# Patient Record
Sex: MALE | Race: OTHER | Employment: FULL TIME | URBAN - NONMETROPOLITAN AREA
[De-identification: names, ages, dates, MRNs, and addresses within clinical notes are randomized per-mention and may not be internally consistent; named-entity substitution may affect disease eponyms.]

---

## 2024-07-19 ENCOUNTER — APPOINTMENT (EMERGENCY)
Dept: CT IMAGING | Facility: HOSPITAL | Age: 44
End: 2024-07-19
Payer: COMMERCIAL

## 2024-07-19 ENCOUNTER — HOSPITAL ENCOUNTER (EMERGENCY)
Facility: HOSPITAL | Age: 44
Discharge: HOME | End: 2024-07-19
Attending: EMERGENCY MEDICINE
Payer: COMMERCIAL

## 2024-07-19 VITALS
WEIGHT: 260.58 LBS | SYSTOLIC BLOOD PRESSURE: 138 MMHG | OXYGEN SATURATION: 97 % | HEART RATE: 54 BPM | RESPIRATION RATE: 15 BRPM | TEMPERATURE: 98.6 F | DIASTOLIC BLOOD PRESSURE: 78 MMHG

## 2024-07-19 DIAGNOSIS — R51.9 ACUTE NONINTRACTABLE HEADACHE: ICD-10-CM

## 2024-07-19 DIAGNOSIS — I10 ESSENTIAL HYPERTENSION: Primary | ICD-10-CM

## 2024-07-19 LAB
ALBUMIN SERPL BCG-MCNC: 4.3 G/DL (ref 3.5–5)
ALP SERPL-CCNC: 77 U/L (ref 34–104)
ALT SERPL W P-5'-P-CCNC: 26 U/L (ref 7–52)
ANION GAP SERPL CALCULATED.3IONS-SCNC: 10 MMOL/L (ref 4–13)
AST SERPL W P-5'-P-CCNC: 19 U/L (ref 13–39)
ATRIAL RATE: 56 BPM
BASOPHILS # BLD AUTO: 0.03 THOUSANDS/ÂΜL (ref 0–0.1)
BASOPHILS NFR BLD AUTO: 0 % (ref 0–1)
BILIRUB DIRECT SERPL-MCNC: 0.14 MG/DL (ref 0–0.2)
BILIRUB SERPL-MCNC: 0.68 MG/DL (ref 0.2–1)
BUN SERPL-MCNC: 13 MG/DL (ref 5–25)
CALCIUM SERPL-MCNC: 9.6 MG/DL (ref 8.4–10.2)
CARDIAC TROPONIN I PNL SERPL HS: 3 NG/L
CHLORIDE SERPL-SCNC: 103 MMOL/L (ref 96–108)
CO2 SERPL-SCNC: 26 MMOL/L (ref 21–32)
CREAT SERPL-MCNC: 0.94 MG/DL (ref 0.6–1.3)
EOSINOPHIL # BLD AUTO: 0.1 THOUSAND/ÂΜL (ref 0–0.61)
EOSINOPHIL NFR BLD AUTO: 1 % (ref 0–6)
ERYTHROCYTE [DISTWIDTH] IN BLOOD BY AUTOMATED COUNT: 12.6 % (ref 11.6–15.1)
GFR SERPL CREATININE-BSD FRML MDRD: 99 ML/MIN/1.73SQ M
GLUCOSE SERPL-MCNC: 94 MG/DL (ref 65–140)
HCT VFR BLD AUTO: 47.4 % (ref 36.5–49.3)
HGB BLD-MCNC: 15.8 G/DL (ref 12–17)
IMM GRANULOCYTES # BLD AUTO: 0.03 THOUSAND/UL (ref 0–0.2)
IMM GRANULOCYTES NFR BLD AUTO: 0 % (ref 0–2)
LIPASE SERPL-CCNC: 7 U/L (ref 11–82)
LYMPHOCYTES # BLD AUTO: 2.09 THOUSANDS/ÂΜL (ref 0.6–4.47)
LYMPHOCYTES NFR BLD AUTO: 28 % (ref 14–44)
MAGNESIUM SERPL-MCNC: 1.9 MG/DL (ref 1.9–2.7)
MCH RBC QN AUTO: 31.4 PG (ref 26.8–34.3)
MCHC RBC AUTO-ENTMCNC: 33.3 G/DL (ref 31.4–37.4)
MCV RBC AUTO: 94 FL (ref 82–98)
MONOCYTES # BLD AUTO: 0.66 THOUSAND/ÂΜL (ref 0.17–1.22)
MONOCYTES NFR BLD AUTO: 9 % (ref 4–12)
NEUTROPHILS # BLD AUTO: 4.52 THOUSANDS/ÂΜL (ref 1.85–7.62)
NEUTS SEG NFR BLD AUTO: 62 % (ref 43–75)
NRBC BLD AUTO-RTO: 0 /100 WBCS
P AXIS: 23 DEGREES
PLATELET # BLD AUTO: 231 THOUSANDS/UL (ref 149–390)
PMV BLD AUTO: 10.3 FL (ref 8.9–12.7)
POTASSIUM SERPL-SCNC: 3.9 MMOL/L (ref 3.5–5.3)
PR INTERVAL: 134 MS
PROT SERPL-MCNC: 7.2 G/DL (ref 6.4–8.4)
QRS AXIS: 31 DEGREES
QRSD INTERVAL: 86 MS
QT INTERVAL: 398 MS
QTC INTERVAL: 384 MS
RBC # BLD AUTO: 5.03 MILLION/UL (ref 3.88–5.62)
SODIUM SERPL-SCNC: 139 MMOL/L (ref 135–147)
T WAVE AXIS: 14 DEGREES
VENTRICULAR RATE: 56 BPM
WBC # BLD AUTO: 7.43 THOUSAND/UL (ref 4.31–10.16)

## 2024-07-19 PROCEDURE — 96374 THER/PROPH/DIAG INJ IV PUSH: CPT

## 2024-07-19 PROCEDURE — 83735 ASSAY OF MAGNESIUM: CPT | Performed by: EMERGENCY MEDICINE

## 2024-07-19 PROCEDURE — 36415 COLL VENOUS BLD VENIPUNCTURE: CPT | Performed by: EMERGENCY MEDICINE

## 2024-07-19 PROCEDURE — 99285 EMERGENCY DEPT VISIT HI MDM: CPT | Performed by: EMERGENCY MEDICINE

## 2024-07-19 PROCEDURE — 83690 ASSAY OF LIPASE: CPT | Performed by: EMERGENCY MEDICINE

## 2024-07-19 PROCEDURE — 80076 HEPATIC FUNCTION PANEL: CPT | Performed by: EMERGENCY MEDICINE

## 2024-07-19 PROCEDURE — 85025 COMPLETE CBC W/AUTO DIFF WBC: CPT | Performed by: EMERGENCY MEDICINE

## 2024-07-19 PROCEDURE — 99284 EMERGENCY DEPT VISIT MOD MDM: CPT

## 2024-07-19 PROCEDURE — 84484 ASSAY OF TROPONIN QUANT: CPT | Performed by: EMERGENCY MEDICINE

## 2024-07-19 PROCEDURE — 70450 CT HEAD/BRAIN W/O DYE: CPT

## 2024-07-19 PROCEDURE — 93005 ELECTROCARDIOGRAM TRACING: CPT

## 2024-07-19 PROCEDURE — 80048 BASIC METABOLIC PNL TOTAL CA: CPT | Performed by: EMERGENCY MEDICINE

## 2024-07-19 RX ORDER — SODIUM CHLORIDE 9 MG/ML
3 INJECTION INTRAVENOUS
Status: DISCONTINUED | OUTPATIENT
Start: 2024-07-19 | End: 2024-07-19 | Stop reason: HOSPADM

## 2024-07-19 RX ORDER — DROPERIDOL 2.5 MG/ML
0.62 INJECTION, SOLUTION INTRAMUSCULAR; INTRAVENOUS ONCE
Status: COMPLETED | OUTPATIENT
Start: 2024-07-19 | End: 2024-07-19

## 2024-07-19 RX ORDER — OLMESARTAN MEDOXOMIL 5 MG/1
5 TABLET ORAL DAILY
COMMUNITY
Start: 2024-06-18 | End: 2024-07-19

## 2024-07-19 RX ORDER — OLMESARTAN MEDOXOMIL 5 MG/1
5 TABLET ORAL DAILY
Qty: 30 TABLET | Refills: 0 | Status: SHIPPED | OUTPATIENT
Start: 2024-07-19

## 2024-07-19 RX ADMIN — DROPERIDOL 0.62 MG: 2.5 INJECTION, SOLUTION INTRAMUSCULAR; INTRAVENOUS at 17:41

## 2024-07-19 NOTE — DISCHARGE INSTRUCTIONS
Please keep the appointment with your regular doctor next week.    You have been prescribed a medication for high blood pressure: please take it according to instructions.    Please go to the ER if you develop any of the following symptoms:    Chest pain  Difficulty breathing  Sudden weakness on one side of the body  Sudden difficulty speaking  Sudden drooping on one side of the face    Por favor, acuda a la barbi con newman médico habitual la próxima semana.    Le zurita recetado un medicamento para la presión arterial dg: tómelo de acuerdo con las instrucciones.    Acuda a la carlos de emergencias si desarrolla alguno de los siguientes síntomas:    1. Dolor en el pecho  2. Dificultad para respirar  3. Debilidad repentina en un lado del cuerpo  4. Dificultad repentina para hablar  5. Caída repentina de un lado de la maria esther

## 2024-07-19 NOTE — ED PROVIDER NOTES
History  Chief Complaint   Patient presents with    Headache     Pt states that he started with a headache and high blood pressure last night- Pt complains of headache and dizziness today     This is a 44-year-old man with noted past medical history who presents to the emergency department for evaluation of dull/throbbing headache developing yesterday evening accompanied by nausea and a lightheaded sensation; headache began at rest without any preceding trauma or injury. Took acetaminophen with some improvement in symptoms and was able to go to sleep overnight.  This morning, had recurrent headache accompanied by feeling of lightheadedness again with hypertension when checked at home with /101.   Patient had been seen in [facility] emergency department on 16 June 2024 with complaint of chest pain with negative workup at the time.  Was started on olmesartan due to hypertension: completed the prescribed 30-day course of the medication but ran out several days ago as there were no refills.  Did not check blood pressure in the interval when he was taking his medication. He has an appointment with primary physician next week for further evaluation. As such, he is not taking any anti-hypertensive medication currently.  He had never been treated for hypertension previously.  He has no history of hypertension.  Does not have any history of cardiac disease.  No history of regular or recurrent headaches such as this.  Otherwise in normal state of health until symptom onset.  With onset of headache, no vision changes/extremity weakness/extremity paresthesias/dysarthria/aphasia.        History provided by:  Medical records, patient and spouse   used: No (Patient's spouse prefers to translate)    Headache  Associated symptoms: nausea    Associated symptoms: no abdominal pain, no fatigue, no fever, no photophobia, no seizures, no vomiting and no weakness        Prior to Admission  Medications   Prescriptions Last Dose Informant Patient Reported? Taking?   olmesartan (BENICAR) 5 mg tablet   Yes Yes   Sig: Take 5 mg by mouth daily   olmesartan (BENICAR) 5 mg tablet   No Yes   Sig: Take 1 tablet (5 mg total) by mouth daily      Facility-Administered Medications: None       Past Medical History:   Diagnosis Date    Hypertension        History reviewed. No pertinent surgical history.    History reviewed. No pertinent family history.  I have reviewed and agree with the history as documented.    E-Cigarette/Vaping     E-Cigarette/Vaping Substances     Social History     Tobacco Use    Smoking status: Never    Smokeless tobacco: Never   Substance Use Topics    Alcohol use: Never    Drug use: Never       Review of Systems   Constitutional:  Negative for diaphoresis, fatigue and fever.   Eyes:  Negative for photophobia and visual disturbance.   Respiratory: Negative.     Cardiovascular: Negative.    Gastrointestinal:  Positive for nausea. Negative for abdominal pain and vomiting.   Musculoskeletal: Negative.    Skin: Negative.    Neurological:  Positive for light-headedness and headaches. Negative for seizures, syncope, facial asymmetry, speech difficulty and weakness.   Hematological: Negative.        Physical Exam  Physical Exam  Vitals and nursing note reviewed.   Constitutional:       General: He is awake. He is not in acute distress.     Appearance: Normal appearance. He is well-developed.      Comments: Left arm automated 124/78  Left arm manual 128/80   HENT:      Head: Normocephalic and atraumatic.      Right Ear: Hearing and external ear normal.      Left Ear: Hearing and external ear normal.   Neck:      Trachea: Trachea and phonation normal.      Comments: No rigidity of the neck musculature  Cardiovascular:      Rate and Rhythm: Normal rate and regular rhythm.      Pulses:           Radial pulses are 2+ on the right side and 2+ on the left side.        Dorsalis pedis pulses are 2+ on the  right side and 2+ on the left side.        Posterior tibial pulses are 2+ on the right side and 2+ on the left side.      Heart sounds: Normal heart sounds, S1 normal and S2 normal. No murmur heard.     No friction rub. No gallop.   Pulmonary:      Effort: Pulmonary effort is normal. No respiratory distress.      Breath sounds: Normal breath sounds. No stridor. No decreased breath sounds, wheezing, rhonchi or rales.   Abdominal:      General: There is no distension.      Palpations: There is no mass.      Tenderness: There is no abdominal tenderness. There is no guarding or rebound.   Musculoskeletal:      Cervical back: No signs of trauma or rigidity. No spinous process tenderness or muscular tenderness.   Skin:     General: Skin is warm and dry.   Neurological:      Mental Status: He is alert and oriented to person, place, and time.      GCS: GCS eye subscore is 4. GCS verbal subscore is 5. GCS motor subscore is 6.      Cranial Nerves: No cranial nerve deficit.      Sensory: No sensory deficit.      Motor: No abnormal muscle tone.      Comments: PERRLA; EOMI. Sensation intact to light touch over face in V1-V3 distribution bilaterally. Facial expressions symmetric. Tongue/uvula midline. Shoulder shrug equal bilaterally. Strength 5/5 in UE/LE bilaterally. Sensation intact to light touch in UE/LE bilaterally.  No dysarthria or aphasia         Vital Signs  ED Triage Vitals   Temperature Pulse Respirations Blood Pressure SpO2   07/19/24 1738 07/19/24 1730 07/19/24 1730 07/19/24 1730 07/19/24 1730   98.6 °F (37 °C) 63 22 124/78 97 %      Temp Source Heart Rate Source Patient Position - Orthostatic VS BP Location FiO2 (%)   07/19/24 1738 07/19/24 1900 07/19/24 1730 07/19/24 1730 --   Temporal Monitor Lying Left arm       Pain Score       --                  Vitals:    07/19/24 1733 07/19/24 1806 07/19/24 1830 07/19/24 1900   BP: 128/80 136/93 139/93 138/78   Pulse:   66 (!) 54   Patient Position - Orthostatic VS: Sitting    Sitting         Visual Acuity      ED Medications  Medications   droperidol (INAPSINE) injection 0.625 mg (0.625 mg Intravenous Given 7/19/24 1741)       Diagnostic Studies  Results Reviewed       Procedure Component Value Units Date/Time    Hepatic function panel [[id number]]  (Normal) Collected: 07/19/24 1738    Lab Status: Final result Specimen: Blood from Arm, Right Updated: 07/19/24 1830     Total Bilirubin 0.68 mg/dL      Bilirubin, Direct 0.14 mg/dL      Alkaline Phosphatase 77 U/L      AST 19 U/L      ALT 26 U/L      Total Protein 7.2 g/dL      Albumin 4.3 g/dL     Lipase [[id number]]  (Abnormal) Collected: 07/19/24 1738    Lab Status: Final result Specimen: Blood from Arm, Right Updated: 07/19/24 1830     Lipase 7 u/L     HS Troponin 0hr (reflex protocol) [[id number]]  (Normal) Collected: 07/19/24 1738    Lab Status: Final result Specimen: Blood from Arm, Right Updated: 07/19/24 1808     hs TnI 0hr 3 ng/L     Basic metabolic panel [[id number]] Collected: 07/19/24 1738    Lab Status: Final result Specimen: Blood from Arm, Right Updated: 07/19/24 1802     Sodium 139 mmol/L      Potassium 3.9 mmol/L      Chloride 103 mmol/L      CO2 26 mmol/L      ANION GAP 10 mmol/L      BUN 13 mg/dL      Creatinine 0.94 mg/dL      Glucose 94 mg/dL      Calcium 9.6 mg/dL      eGFR 99 ml/min/1.73sq m     Narrative:      National Kid[employer] Foundation guidelines for Chronic Kidney Disease (CKD):     Stage 1 with normal or high GFR (GFR > 90 mL/min/1.73 square meters)    Stage 2 Mild CKD (GFR = 60-89 mL/min/1.73 square meters)    Stage 3A Moderate CKD (GFR = 45-59 mL/min/1.73 square meters)    Stage 3B Moderate CKD (GFR = 30-44 mL/min/1.73 square meters)    Stage 4 Severe CKD (GFR = 15-29 mL/min/1.73 square meters)    Stage 5 End Stage CKD (GFR <15 mL/min/1.73 square meters)  Note: GFR calculation is accurate only with a steady state creatinine    Magnesium [[id number]]  (Normal) Collected: 07/19/24 1738    Lab Status: Final  result Specimen: Blood from Arm, Right Updated: 07/19/24 1802     Magnesium 1.9 mg/dL     CBC and differential [[id number]] Collected: 07/19/24 1738    Lab Status: Final result Specimen: Blood from Arm, Right Updated: 07/19/24 1744     WBC 7.43 Thousand/uL      RBC 5.03 Million/uL      Hemoglobin 15.8 g/dL      Hematocrit 47.4 %      MCV 94 fL      MCH 31.4 pg      MCHC 33.3 g/dL      RDW 12.6 %      MPV 10.3 fL      Platelets 231 Thousands/uL      nRBC 0 /100 WBCs      Segmented % 62 %      Immature Grans % 0 %      Lymphocytes % 28 %      Monocytes % 9 %      Eosinophils Relative 1 %      Basophils Relative 0 %      Absolute Neutrophils 4.52 Thousands/µL      Absolute Immature Grans 0.03 Thousand/uL      Absolute Lymphocytes 2.09 Thousands/µL      Absolute Monocytes 0.66 Thousand/µL      Eosinophils Absolute 0.10 Thousand/µL      Basophils Absolute 0.03 Thousands/µL                    CT head without contrast   Final Result by Kelby Koch MD (07/19 1903)      No acute intracranial abnormality.                  Workstation performed: [id number]                    Procedures  ECG 12 Lead Documentation Only    Date/Time: 7/19/2024 5:24 PM    Performed by: Vincent Soto DO  Authorized by: Vincent Soto DO    Indications / Diagnosis:  Hypertension  ECG reviewed by me, the ED Provider: yes    Patient location:  ED  Previous ECG:     Comparison to cardiac monitor: Yes    Interpretation:     Interpretation: normal    Rate:     ECG rate:  56    ECG rate assessment: bradycardic    Rhythm:     Rhythm: sinus bradycardia    Ectopy:     Ectopy: none    QRS:     QRS axis:  Normal    QRS intervals:  Normal  Conduction:     Conduction: normal    ST segments:     ST segments:  Normal  T waves:     T waves: normal    Comments:       QRS 86 QTc 384           ED Course  ED Course as of 07/20/24 0009 Fri Jul 19, 2024   1730 Headache with nausea in setting of reported hypertension with patient normotensive in the emergency  department with normal neurologic examination.  Check CT head for any intracranial pathology.  Check CBC/BMP/magnesium/troponin.  Patient's report of lightheadedness and nausea raises at least some concern for ACS for which it is reasonable to evaluate with appropriate diagnostic testing.  He is normotensive at time of my examination and does not require any emergent BP lowering interventions.  Symptomatic management for headache in the meantime.  No dyspnea/palpitations. No chest pain described during my evaluation.       1759 CBC and differential  WBC wnl  Hg/Hct wnl  Plt wnl   1759 CT completed and awaiting interpretation   1814 HS Troponin 0hr (reflex protocol)  Nonischemic   1832 Lipase(!)  Below reference range   1832 Hepatic function panel  Normal   1905 CT head without contrast  FINDINGS:     PARENCHYMA:  No intracranial mass, mass effect or midline shift. No CT signs of acute infarction.  No acute parenchymal hemorrhage.     VENTRICLES AND EXTRA-AXIAL SPACES:  Normal for the patient's age.     VISUALIZED ORBITS:  Normal visualized orbits.     PARANASAL SINUSES:  Mild mucosal thickening of the visualized paranasal sinuses.     CALVARIUM AND EXTRACRANIAL SOFT TISSUES:  Normal.     IMPRESSION:     No acute intracranial abnormality.              1927 Results of workup reviewed with patient and his wife.  No acute abnormalities identified to account for symptoms.  No evidence of endorgan dysfunction associated with the hypertensive episode reported this morning.  He did not have any recorded BPs that were sufficiently high by themselves such that he would need emergent lowering of BP.  He symptomatically improved in the ED otherwise and felt well upon my reevaluation.  While he did not have any markedly elevated BPs, he was consistently hypertensive in the emergency department to the degree that reinitiation of antihypertensive therapy is reasonable.  He stated that he had tolerated the olmesartan well at the 5  mg dose, and I will represcribe it until he is able to see his primary physician next week.  Advised immediate return for any signs or symptoms of endorgan dysfunction which were reviewed specifically with patient and included in discharge instructions.  All questions were answered to satisfaction prior to discharge.           SBIRT 22yo+      Flowsheet Row Most Recent Value   Initial Alcohol Screen: US AUDIT-C     1. How often do you have a drink containing alcohol? 0 Filed at: 07/19/2024 1720   2. How many drinks containing alcohol do you have on a typical day you are drinking?  0 Filed at: 07/19/2024 1720   3a. Male UNDER 65: How often do you have five or more drinks on one occasion? 0 Filed at: 07/19/2024 1720   3b. FEMALE Any Age, or MALE 65+: How often do you have 4 or more drinks on one occassion? 0 Filed at: 07/19/2024 1720   Audit-C Score 0 Filed at: 07/19/2024 1720   PATRICIA: How many times in the past year have you...    Used an illegal drug or used a prescription medication for non-medical reasons? Never Filed at: 07/19/2024 1720                      Medical Decision Making  Amount and/or Complexity of Data Reviewed  Labs: ordered. Decision-making details documented in ED Course.  Radiology: ordered. Decision-making details documented in ED Course.    Risk  Prescription drug management.                 Disposition  Final diagnoses:   Essential hypertension   Acute nonintractable headache     Time reflects when diagnosis was documented in both MDM as applicable and the Disposition within this note       Time User Action Codes Description Comment    7/19/2024  7:20 PM Vincent Soto Add [I10] Essential hypertension     7/19/2024  7:20 PM Vincent Soto Add [R51.9] Acute nonintractable headache           ED Disposition       ED Disposition   Discharge    Condition   Stable    Date/Time   Fri Jul 19, 2024 1920    Comment   Maximiliano Matos discharge to home/self care.                   Follow-up  Information    None         Discharge Medication List as of 7/19/2024  7:24 PM        CONTINUE these medications which have CHANGED    Details   olmesartan (BENICAR) 5 mg tablet Take 1 tablet (5 mg total) by mouth daily, Starting Fri 7/19/2024, Normal             No discharge procedures on file.    PDMP Review       None            ED Provider  Electronically Signed by             Vincent Soto DO  07/20/24 0009

## 2024-07-22 LAB
ATRIAL RATE: 56 BPM
P AXIS: 23 DEGREES
PR INTERVAL: 134 MS
QRS AXIS: 31 DEGREES
QRSD INTERVAL: 86 MS
QT INTERVAL: 398 MS
QTC INTERVAL: 384 MS
T WAVE AXIS: 14 DEGREES
VENTRICULAR RATE: 56 BPM

## 2024-07-22 PROCEDURE — 93010 ELECTROCARDIOGRAM REPORT: CPT | Performed by: INTERNAL MEDICINE

## 2025-04-26 ENCOUNTER — HOSPITAL ENCOUNTER (EMERGENCY)
Facility: HOSPITAL | Age: 45
Discharge: HOME | End: 2025-04-26
Attending: EMERGENCY MEDICINE
Payer: COMMERCIAL

## 2025-04-26 VITALS
HEART RATE: 70 BPM | OXYGEN SATURATION: 97 % | TEMPERATURE: 97.2 F | DIASTOLIC BLOOD PRESSURE: 83 MMHG | RESPIRATION RATE: 16 BRPM | SYSTOLIC BLOOD PRESSURE: 129 MMHG

## 2025-04-26 DIAGNOSIS — G43.909 MIGRAINE HEADACHE: Primary | ICD-10-CM

## 2025-04-26 PROCEDURE — 99282 EMERGENCY DEPT VISIT SF MDM: CPT

## 2025-04-26 PROCEDURE — 96365 THER/PROPH/DIAG IV INF INIT: CPT

## 2025-04-26 PROCEDURE — 99284 EMERGENCY DEPT VISIT MOD MDM: CPT | Performed by: EMERGENCY MEDICINE

## 2025-04-26 PROCEDURE — 96375 TX/PRO/DX INJ NEW DRUG ADDON: CPT

## 2025-04-26 RX ORDER — DEXAMETHASONE SODIUM PHOSPHATE 10 MG/ML
10 INJECTION, SOLUTION INTRAMUSCULAR; INTRAVENOUS ONCE
Status: COMPLETED | OUTPATIENT
Start: 2025-04-26 | End: 2025-04-26

## 2025-04-26 RX ORDER — SODIUM CHLORIDE, SODIUM GLUCONATE, SODIUM ACETATE, POTASSIUM CHLORIDE, MAGNESIUM CHLORIDE, SODIUM PHOSPHATE, DIBASIC, AND POTASSIUM PHOSPHATE .53; .5; .37; .037; .03; .012; .00082 G/100ML; G/100ML; G/100ML; G/100ML; G/100ML; G/100ML; G/100ML
1000 INJECTION, SOLUTION INTRAVENOUS ONCE
Status: COMPLETED | OUTPATIENT
Start: 2025-04-26 | End: 2025-04-26

## 2025-04-26 RX ORDER — KETOROLAC TROMETHAMINE 30 MG/ML
15 INJECTION, SOLUTION INTRAMUSCULAR; INTRAVENOUS ONCE
Status: COMPLETED | OUTPATIENT
Start: 2025-04-26 | End: 2025-04-26

## 2025-04-26 RX ORDER — METOCLOPRAMIDE HYDROCHLORIDE 5 MG/ML
10 INJECTION INTRAMUSCULAR; INTRAVENOUS ONCE
Status: COMPLETED | OUTPATIENT
Start: 2025-04-26 | End: 2025-04-26

## 2025-04-26 RX ORDER — DIPHENHYDRAMINE HYDROCHLORIDE 50 MG/ML
25 INJECTION, SOLUTION INTRAMUSCULAR; INTRAVENOUS ONCE
Status: COMPLETED | OUTPATIENT
Start: 2025-04-26 | End: 2025-04-26

## 2025-04-26 RX ADMIN — METOCLOPRAMIDE 10 MG: 5 INJECTION, SOLUTION INTRAMUSCULAR; INTRAVENOUS at 09:53

## 2025-04-26 RX ADMIN — DIPHENHYDRAMINE HYDROCHLORIDE 25 MG: 50 INJECTION, SOLUTION INTRAMUSCULAR; INTRAVENOUS at 09:52

## 2025-04-26 RX ADMIN — KETOROLAC TROMETHAMINE 15 MG: 30 INJECTION, SOLUTION INTRAMUSCULAR at 09:51

## 2025-04-26 RX ADMIN — SODIUM CHLORIDE, SODIUM GLUCONATE, SODIUM ACETATE, POTASSIUM CHLORIDE, MAGNESIUM CHLORIDE, SODIUM PHOSPHATE, DIBASIC, AND POTASSIUM PHOSPHATE 1000 ML: .53; .5; .37; .037; .03; .012; .00082 INJECTION, SOLUTION INTRAVENOUS at 09:50

## 2025-04-26 RX ADMIN — DEXAMETHASONE SODIUM PHOSPHATE 10 MG: 10 INJECTION, SOLUTION INTRAMUSCULAR; INTRAVENOUS at 09:51
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
## 2025-04-26 NOTE — Clinical Note
Maximiliano Matos was seen and treated in our emergency department on 4/26/2025.                Diagnosis:     Maximiliano  may return to work on return date.    He may return on this date: 04/28/2025    Please excuse from work 4/25, 4/26, and 4/27/2025     If you have any questions or concerns, please don't hesitate to call.      Toi Amaya, DO    ______________________________           _______________          _______________  Hospital Representative                              Date                                Time